# Patient Record
Sex: MALE | Race: WHITE | Employment: FULL TIME | URBAN - NONMETROPOLITAN AREA
[De-identification: names, ages, dates, MRNs, and addresses within clinical notes are randomized per-mention and may not be internally consistent; named-entity substitution may affect disease eponyms.]

---

## 2017-04-19 ENCOUNTER — TELEPHONE (OUTPATIENT)
Dept: INTERNAL MEDICINE | Age: 62
End: 2017-04-19

## 2023-05-09 ENCOUNTER — HOSPITAL ENCOUNTER (OUTPATIENT)
Dept: INTERVENTIONAL RADIOLOGY/VASCULAR | Age: 68
Discharge: HOME | End: 2023-05-09
Payer: MEDICARE

## 2023-05-09 VITALS
TEMPERATURE: 97.3 F | SYSTOLIC BLOOD PRESSURE: 126 MMHG | OXYGEN SATURATION: 96 % | HEART RATE: 70 BPM | DIASTOLIC BLOOD PRESSURE: 72 MMHG

## 2023-05-09 PROCEDURE — 62323 NJX INTERLAMINAR LMBR/SAC: CPT

## 2023-05-09 PROCEDURE — 6360000004 HC RX CONTRAST MEDICATION: Performed by: RADIOLOGY

## 2023-05-09 PROCEDURE — 6360000002 HC RX W HCPCS: Performed by: RADIOLOGY

## 2023-05-09 PROCEDURE — 2500000003 HC RX 250 WO HCPCS: Performed by: RADIOLOGY

## 2023-05-09 PROCEDURE — 2709999900 IR FLUORO GUIDED LUMBAR PUNCTURE THERAPY

## 2023-05-09 RX ORDER — SPIRONOLACTONE 25 MG/1
25 TABLET ORAL DAILY
COMMUNITY

## 2023-05-09 RX ORDER — DEXAMETHASONE SODIUM PHOSPHATE 4 MG/ML
4 INJECTION, SOLUTION INTRA-ARTICULAR; INTRALESIONAL; INTRAMUSCULAR; INTRAVENOUS; SOFT TISSUE ONCE
Status: COMPLETED | OUTPATIENT
Start: 2023-05-09 | End: 2023-05-09

## 2023-05-09 RX ORDER — BUPIVACAINE HYDROCHLORIDE 2.5 MG/ML
5 INJECTION, SOLUTION EPIDURAL; INFILTRATION; INTRACAUDAL ONCE
Status: COMPLETED | OUTPATIENT
Start: 2023-05-09 | End: 2023-05-09

## 2023-05-09 RX ADMIN — IOHEXOL 1 ML: 180 INJECTION INTRAVENOUS at 14:43

## 2023-05-09 RX ADMIN — DEXAMETHASONE SODIUM PHOSPHATE 4 MG: 4 INJECTION, SOLUTION INTRAMUSCULAR; INTRAVENOUS at 14:42

## 2023-05-09 RX ADMIN — BUPIVACAINE HYDROCHLORIDE 2 ML: 2.5 INJECTION, SOLUTION EPIDURAL; INFILTRATION; INTRACAUDAL; PERINEURAL at 14:42

## 2023-05-09 ASSESSMENT — PAIN DESCRIPTION - ORIENTATION: ORIENTATION: LEFT;RIGHT

## 2023-05-09 ASSESSMENT — PAIN SCALES - GENERAL: PAINLEVEL_OUTOF10: 2

## 2023-05-09 ASSESSMENT — PAIN DESCRIPTION - LOCATION: LOCATION: BACK;HIP

## 2023-05-09 NOTE — H&P
Formulation and discussion of sedation / procedure plans, risks, benefits, side effects and alternatives with patient and/or responsible adult completed. History and Physical reviewed and unchanged.     Electronically signed by Jose Wetzel MD on 5/9/23 at 1:58 PM EDT

## 2023-05-09 NOTE — DISCHARGE INSTRUCTIONS
Epidural injection  BLOCK DISCHARGE INSTRUCTION    1. Take it easy and rest the remainder of the day. 2.  Do not drive for the remainder of the day. 3.  You may use ice on the area of the injection to help alleviate discomfort. Avoid heat for the remainder of the day. 4.  Do not soak in water or use a tub bath or hot tub for the remainder of the day. 5.  You may resume normal eating and drinking. 6. This evening you may resume your pain medications and any other medications you had stopped prior to the injection. 7.  Resume activities starting tomorrow in gradual manner. You may resume physical therapy. 8. Follow up with ordering doctor if you continue to have pain. 9.  If you do have another nerve block ordered, follow instructions below:  Bring someone to drive you home. Nothing to eat or drink 2 hours prior. No blood thinners or aspirin products 5 days prior. 8.  Notify Radiology ([phone]), or go to the emergency room immediately if you develop any of the following symptoms: fever, chills, changes in mental status, severe pain, difficulty breathing, prolonged, severe headache, numbness or weakness in your arms or legs, loss of control of your bladder or bowel, excessive redness, swelling, or drainage from the area of injection.

## 2023-05-09 NOTE — PROGRESS NOTES
1350 Patient ambulatory to OPN for Lumbar epidural injection. Patient denies any blood thinners. Confirms NPO last 2 hours and  for discharge. Patient states he has lower back pain travels to buttocks and hips both side. Denies any numbness or tingling. PT RIGHTS AND RESPONSIBILITIES OFFERED TO PT.     1420 Patient to IR for procedure. 1500 Patient back to room post procedure. Band aid to lower back dry and intact. Denies any new pain or numbness. Pedal push and pull equal and strong. 1515 Patient resting in bed. Denies any complaints. Band aid to lower back dry and intact. Patient able to ambulate in room. AVS reviewed with patient. Verbalizes understanding. Patient left ambulatory to discharge lobby.        _M___ Safety:       (Environmental)  [place] to environment  Ensure ID band is correct and in place/ allergy band as needed  Assess for fall risk  Initiate fall precautions as applicable (fall band, side rails, etc.)  Call light within reach  Bed in low position/ wheels locked    _M___ Pain:       Assess pain level and characteristics  Administer analgesics as ordered  Assess effectiveness of pain management and report to MD as needed    _M___ Knowledge Deficit:  Assess baseline knowledge  Provide teaching at level of understanding  Provide teaching via preferred learning method  Evaluate teaching effectiveness    _M___ Hemodynamic/Respiratory Status:       (Pre and Post Procedure Monitoring)  Assess/Monitor vital signs and LOC  Assess Baseline SpO2 prior to any sedation  Obtain weight/height  Assess vital signs/ LOC until patient meets discharge criteria  Monitor procedure site and notify MD of any issues    _M___ Infection-Risk of Central Venous Catheter:  Monitor for infection signs and symptoms (catheter site redness, temperature elevation, etc)  Assess for infection risks  Educate regarding infection prevention  Manage central venous catheter (flushes/ dressing changes per protocol)

## 2023-05-09 NOTE — PROGRESS NOTES
1430 Patient received in IR for lumbar epidural injection. 1435 This procedure has been fully reviewed with the patient and written informed consent has been obtained. 1441 Procedure started with Dr. Jorje Ramos. 1446 Procedure completed; patient tolerated well. Band aid to lower back; no bleeding noted. 1448 Patient on cart; comfort ensured. 1450 Patient taken to OPN via cart.

## 2023-05-09 NOTE — OP NOTE
Department of Radiology  Post Procedure Progress Note    Pre-Procedure Diagnosis:  Lumbar radiculopathy     Procedure Performed:  Epidural block/steroid injection      Anesthesia: local     Findings: successful    Immediate Complications:  None    Estimated Blood Loss: minimal    SEE DICTATED PROCEDURE NOTE FOR COMPLETE DETAILS.       Keily Hernandez MD   5/9/2023 2:48 PM

## 2024-12-02 LAB
BUN BLDV-MCNC: 26 MG/DL
CALCIUM SERPL-MCNC: 8.5 MG/DL
CHLORIDE BLD-SCNC: 101 MMOL/L
CO2: 27 MMOL/L
CREAT SERPL-MCNC: 1.4 MG/DL
EGFR: 55
GLUCOSE BLD-MCNC: 171 MG/DL
POTASSIUM SERPL-SCNC: 4 MMOL/L
SODIUM BLD-SCNC: 142 MMOL/L

## 2025-01-07 LAB
ALBUMIN: 3.4 G/DL
ALP BLD-CCNC: 80 U/L
ALT SERPL-CCNC: 20 U/L
ANION GAP SERPL CALCULATED.3IONS-SCNC: ABNORMAL MMOL/L
AST SERPL-CCNC: 25 U/L
BASOPHILS ABSOLUTE: ABNORMAL
BASOPHILS RELATIVE PERCENT: ABNORMAL
BILIRUB SERPL-MCNC: 0.4 MG/DL (ref 0.1–1.4)
BUN BLDV-MCNC: 33 MG/DL
CALCIUM SERPL-MCNC: 8.5 MG/DL
CHLORIDE BLD-SCNC: 106 MMOL/L
CO2: 22 MMOL/L
CREAT SERPL-MCNC: 2 MG/DL
EOSINOPHILS ABSOLUTE: ABNORMAL
EOSINOPHILS RELATIVE PERCENT: ABNORMAL
GFR, ESTIMATED: 36
GLUCOSE BLD-MCNC: 156 MG/DL
HCT VFR BLD CALC: 26.5 % (ref 41–53)
HEMOGLOBIN: 8.5 G/DL (ref 13.5–17.5)
LYMPHOCYTES ABSOLUTE: ABNORMAL
LYMPHOCYTES RELATIVE PERCENT: ABNORMAL
MCH RBC QN AUTO: ABNORMAL PG
MCHC RBC AUTO-ENTMCNC: ABNORMAL G/DL
MCV RBC AUTO: ABNORMAL FL
MONOCYTES ABSOLUTE: ABNORMAL
MONOCYTES RELATIVE PERCENT: ABNORMAL
NEUTROPHILS ABSOLUTE: ABNORMAL
NEUTROPHILS RELATIVE PERCENT: ABNORMAL
PLATELET # BLD: 311 K/ΜL
PMV BLD AUTO: ABNORMAL FL
POTASSIUM SERPL-SCNC: 4.6 MMOL/L
RBC # BLD: 2.65 10^6/ΜL
SODIUM BLD-SCNC: 142 MMOL/L
TOTAL PROTEIN: 6.3 G/DL (ref 6.4–8.2)
WBC # BLD: 13.7 10^3/ML

## 2025-01-08 PROBLEM — J98.11 ATELECTASIS: Status: ACTIVE | Noted: 2024-12-27

## 2025-01-08 PROBLEM — R73.9 STRESS HYPERGLYCEMIA: Status: ACTIVE | Noted: 2024-12-26

## 2025-01-08 PROBLEM — Z95.1 S/P CABG X 4: Status: ACTIVE | Noted: 2024-12-26

## 2025-01-08 PROBLEM — E87.70 HYPERVOLEMIA: Status: ACTIVE | Noted: 2024-12-28

## 2025-01-08 PROBLEM — G47.33 OBSTRUCTIVE SLEEP APNEA ON CPAP: Status: ACTIVE | Noted: 2024-12-26

## 2025-01-08 PROBLEM — J95.811 PNEUMOTHORAX, POSTOPERATIVE: Status: ACTIVE | Noted: 2024-12-27

## 2025-01-08 PROBLEM — I10 PRIMARY HYPERTENSION: Status: ACTIVE | Noted: 2024-12-26

## 2025-01-08 PROBLEM — K56.7 POSTOPERATIVE ILEUS (HCC): Status: ACTIVE | Noted: 2024-12-28

## 2025-01-08 PROBLEM — I25.10 3-VESSEL CAD: Status: ACTIVE | Noted: 2024-12-26

## 2025-01-08 PROBLEM — F17.200 SMOKER: Status: ACTIVE | Noted: 2024-12-26

## 2025-01-08 PROBLEM — E78.5 HLD (HYPERLIPIDEMIA): Status: ACTIVE | Noted: 2024-12-26

## 2025-01-08 PROBLEM — K91.89 POSTOPERATIVE ILEUS (HCC): Status: ACTIVE | Noted: 2024-12-28

## 2025-01-08 PROBLEM — I95.9 HYPOTENSION, UNSPECIFIED: Status: ACTIVE | Noted: 2024-12-27

## 2025-01-08 PROBLEM — T81.9XXA COMPLICATION OF SURGICAL PROCEDURE: Status: ACTIVE | Noted: 2024-12-27

## 2025-01-08 PROBLEM — G89.18 POSTOPERATIVE PAIN: Status: ACTIVE | Noted: 2024-12-26

## 2025-01-08 PROBLEM — I25.10 CORONARY ARTERY DISEASE INVOLVING NATIVE CORONARY ARTERY OF NATIVE HEART: Status: ACTIVE | Noted: 2024-12-26

## 2025-01-08 PROBLEM — Z71.6 ENCOUNTER FOR SMOKING CESSATION COUNSELING: Status: ACTIVE | Noted: 2024-12-17

## 2025-01-08 PROBLEM — M17.9 OSTEOARTHRITIS OF KNEE: Status: ACTIVE | Noted: 2025-01-08

## 2025-01-08 PROBLEM — E11.9 CONTROLLED TYPE 2 DIABETES MELLITUS WITHOUT COMPLICATION, WITHOUT LONG-TERM CURRENT USE OF INSULIN (HCC): Status: ACTIVE | Noted: 2025-01-02

## 2025-01-08 PROBLEM — K59.00 CONSTIPATION: Status: ACTIVE | Noted: 2024-12-28

## 2025-01-08 RX ORDER — AMIODARONE HYDROCHLORIDE 200 MG/1
200 TABLET ORAL DAILY
COMMUNITY
Start: 2025-01-02 | End: 2025-02-05

## 2025-01-08 RX ORDER — POLYETHYLENE GLYCOL 3350 17 G/17G
17 POWDER, FOR SOLUTION ORAL DAILY
COMMUNITY
Start: 2025-01-02

## 2025-01-08 RX ORDER — ASPIRIN 81 MG/1
81 TABLET ORAL DAILY
COMMUNITY

## 2025-01-08 RX ORDER — PANTOPRAZOLE SODIUM 20 MG/1
20 TABLET, DELAYED RELEASE ORAL DAILY
COMMUNITY
Start: 2025-01-02 | End: 2025-02-01

## 2025-01-08 RX ORDER — POTASSIUM CHLORIDE 750 MG/1
10 TABLET, EXTENDED RELEASE ORAL DAILY
COMMUNITY
Start: 2025-01-02

## 2025-01-08 RX ORDER — AMOXICILLIN 250 MG
1 CAPSULE ORAL DAILY
COMMUNITY
Start: 2025-01-02

## 2025-01-08 RX ORDER — MAGNESIUM OXIDE 400 MG/1
400 TABLET ORAL DAILY
COMMUNITY
Start: 2025-01-02

## 2025-01-08 RX ORDER — METFORMIN HYDROCHLORIDE 500 MG/1
500 TABLET, EXTENDED RELEASE ORAL
COMMUNITY
Start: 2024-09-20

## 2025-01-08 RX ORDER — FUROSEMIDE 40 MG/1
40 TABLET ORAL DAILY
COMMUNITY
Start: 2025-01-02

## 2025-01-08 RX ORDER — METOPROLOL SUCCINATE 25 MG/1
25 TABLET, EXTENDED RELEASE ORAL DAILY
COMMUNITY
Start: 2025-01-03

## 2025-01-08 RX ORDER — ATORVASTATIN CALCIUM 40 MG/1
40 TABLET, FILM COATED ORAL NIGHTLY
COMMUNITY
Start: 2025-01-01

## 2025-02-06 ENCOUNTER — OFFICE VISIT (OUTPATIENT)
Dept: NEPHROLOGY | Age: 70
End: 2025-02-06
Payer: COMMERCIAL

## 2025-02-06 VITALS
DIASTOLIC BLOOD PRESSURE: 70 MMHG | WEIGHT: 202 LBS | OXYGEN SATURATION: 98 % | BODY MASS INDEX: 28.98 KG/M2 | HEART RATE: 64 BPM | SYSTOLIC BLOOD PRESSURE: 126 MMHG

## 2025-02-06 DIAGNOSIS — N18.31 STAGE 3A CHRONIC KIDNEY DISEASE (HCC): Primary | ICD-10-CM

## 2025-02-06 PROCEDURE — 1123F ACP DISCUSS/DSCN MKR DOCD: CPT | Performed by: INTERNAL MEDICINE

## 2025-02-06 PROCEDURE — G2211 COMPLEX E/M VISIT ADD ON: HCPCS | Performed by: INTERNAL MEDICINE

## 2025-02-06 PROCEDURE — 3074F SYST BP LT 130 MM HG: CPT | Performed by: INTERNAL MEDICINE

## 2025-02-06 PROCEDURE — 99214 OFFICE O/P EST MOD 30 MIN: CPT | Performed by: INTERNAL MEDICINE

## 2025-02-06 PROCEDURE — 3078F DIAST BP <80 MM HG: CPT | Performed by: INTERNAL MEDICINE

## 2025-02-06 NOTE — PROGRESS NOTES
[facility] [place] [facility]  [facility]  [address]  [address]  [facility] [address]  Dept: [phone]  Loc: [phone]  Outpatient Consult  [phone]  2/6/2025 11:08 AM EST        Pt Name:    Nirmal Trinidad  YOB: 1955  Primary Care Physician:  Tamia Orozco, APRN - NP     Chief Complaint:   Chief Complaint   Patient presents with    New Patient     Ref Tamia Orozco CKDIII        Background Information/Interval History:   The patient is a 69 y.o. year old  male referred by PCP for CKD III.   He has a hx of HTN, HLD, SOFIYA, CAD. Was recently hospitalized a month ago at [facility] for CAD/CABG x 4 with aortic root replacement.   Creatinine during hospitalization around 1.8 did peak at 2.2.     Prior to that  looks like he ranged 1.4-1.6 mg/dL.   Was on lasix which stopped a few weeks ago.   Most recent creatinine 1.5.  Denies nsaid use.   Mild edema which has improved.   Hx tobacco use.   + slow stream and dribbling.  Is diabetic past 1-2 years, on metformin.          Allergies:  Patient has no known allergies.        Past Medical History:  Past Medical History:   Diagnosis Date    History of nephrolithiasis 4/2014    s/p stent, laser - [place] [facility]    Hypercholesterolemia     borderline - no medication    Hyperglycemia     fasting glucose 135    Incomplete tear of right rotator cuff     Primary hypertension 12/26/2024    RBBB (right bundle branch block)     Sleep apnea     wears CPAP nightly    Tobacco abuse         Past Surgical History:  Past Surgical History:   Procedure Laterality Date    KIDNEY STONE SURGERY  4-2014    URETER STENT PLACEMENT  4-2015    WISDOM TOOTH EXTRACTION  1983        Family History:  Family History   Problem Relation Age of Onset    High Blood Pressure Mother     High Cholesterol Mother     Arthritis Mother     Asthma Father     Emphysema Father         Social History:  Social History     Socioeconomic

## 2025-05-01 LAB
BASOPHILS ABSOLUTE: ABNORMAL
BASOPHILS RELATIVE PERCENT: ABNORMAL
BILIRUBIN, URINE: NEGATIVE
BLOOD, URINE: NEGATIVE
CLARITY, UA: CLEAR
COLOR, UA: YELLOW
CREATININE URINE: 196 MG/DL
EOSINOPHILS ABSOLUTE: ABNORMAL
EOSINOPHILS RELATIVE PERCENT: ABNORMAL
ESTIMATED AVERAGE GLUCOSE: NORMAL
GLUCOSE URINE: NEGATIVE
HBA1C MFR BLD: 6.2 %
HCT VFR BLD CALC: 36.8 % (ref 41–53)
HEMOGLOBIN: 12.2 G/DL (ref 13.5–17.5)
KETONES, URINE: NEGATIVE
LEUKOCYTE ESTERASE, URINE: NEGATIVE
LYMPHOCYTES ABSOLUTE: ABNORMAL
LYMPHOCYTES RELATIVE PERCENT: ABNORMAL
MCH RBC QN AUTO: ABNORMAL PG
MCHC RBC AUTO-ENTMCNC: ABNORMAL G/DL
MCV RBC AUTO: ABNORMAL FL
MICROALBUMIN/CREAT 24H UR: 5520 MG/DL
MICROALBUMIN/CREAT UR-RTO: 28 MG/G
MONOCYTES ABSOLUTE: ABNORMAL
MONOCYTES RELATIVE PERCENT: ABNORMAL
NEUTROPHILS ABSOLUTE: ABNORMAL
NEUTROPHILS RELATIVE PERCENT: ABNORMAL
NITRITE, URINE: NEGATIVE
PH UA: 5.5 (ref 4.5–8)
PHOSPHORUS: 3.9 MG/DL
PLATELET # BLD: 186 K/ΜL
PMV BLD AUTO: ABNORMAL FL
PROTEIN UA: NORMAL
PTH INTACT: 31
RBC # BLD: 3.86 10^6/ΜL
SPECIFIC GRAVITY UA: 1.03 (ref 1–1.03)
UROBILINOGEN, URINE: NORMAL
WBC # BLD: 5.5 10^3/ML

## 2025-05-09 ENCOUNTER — OFFICE VISIT (OUTPATIENT)
Dept: NEPHROLOGY | Age: 70
End: 2025-05-09
Payer: MEDICARE

## 2025-05-09 VITALS
OXYGEN SATURATION: 98 % | BODY MASS INDEX: 28.98 KG/M2 | DIASTOLIC BLOOD PRESSURE: 60 MMHG | SYSTOLIC BLOOD PRESSURE: 114 MMHG | WEIGHT: 202 LBS | HEART RATE: 90 BPM

## 2025-05-09 DIAGNOSIS — N18.31 STAGE 3A CHRONIC KIDNEY DISEASE (HCC): Primary | ICD-10-CM

## 2025-05-09 PROCEDURE — 1123F ACP DISCUSS/DSCN MKR DOCD: CPT | Performed by: INTERNAL MEDICINE

## 2025-05-09 PROCEDURE — G8427 DOCREV CUR MEDS BY ELIG CLIN: HCPCS | Performed by: INTERNAL MEDICINE

## 2025-05-09 PROCEDURE — 3017F COLORECTAL CA SCREEN DOC REV: CPT | Performed by: INTERNAL MEDICINE

## 2025-05-09 PROCEDURE — 4004F PT TOBACCO SCREEN RCVD TLK: CPT | Performed by: INTERNAL MEDICINE

## 2025-05-09 PROCEDURE — 1159F MED LIST DOCD IN RCRD: CPT | Performed by: INTERNAL MEDICINE

## 2025-05-09 PROCEDURE — G8419 CALC BMI OUT NRM PARAM NOF/U: HCPCS | Performed by: INTERNAL MEDICINE

## 2025-05-09 PROCEDURE — 99214 OFFICE O/P EST MOD 30 MIN: CPT | Performed by: INTERNAL MEDICINE

## 2025-05-09 PROCEDURE — 3074F SYST BP LT 130 MM HG: CPT | Performed by: INTERNAL MEDICINE

## 2025-05-09 PROCEDURE — 1160F RVW MEDS BY RX/DR IN RCRD: CPT | Performed by: INTERNAL MEDICINE

## 2025-05-09 PROCEDURE — 3078F DIAST BP <80 MM HG: CPT | Performed by: INTERNAL MEDICINE

## 2025-05-09 NOTE — PROGRESS NOTES
[facility] [place] [facility]  [facility]  [address]  [address]  [place] [place] [address]  Dept: [phone]  Loc: [phone]  Progress Note  5/9/2025 9:07 AM      Pt Name:    Nirmal Trinidad  YOB: 1955  Primary Care Physician:  Tamia Orozco, ROSANA - NP     Chief Complaint:   Chief Complaint   Patient presents with    Follow-up     CKD III        History of Present Illness:   This is a follow-up visit for CKD III.   He has a hx of DM, HTN, HLD, SOFIYA, CAD/CABG x 4 with aortic root replacement. Was recently hospitalized a month ago at [facility] for CAD/CABG x 4 with aortic root replacement.       Feels ok. No complaints.   Sodium was 149.  States he had some hypotension SBP 90s after cardiac rehab last week but was asymptomatic. Hasn't been drinking as much water.      Pertinent items are noted in HPI.         Past History:  Past Medical History:   Diagnosis Date    History of nephrolithiasis 4/2014    s/p stent, laser - [place]    Hypercholesterolemia     borderline - no medication    Hyperglycemia     fasting glucose 135    Incomplete tear of right rotator cuff     Primary hypertension 12/26/2024    RBBB (right bundle branch block)     Sleep apnea     wears CPAP nightly    Tobacco abuse      Past Surgical History:   Procedure Laterality Date    KIDNEY STONE SURGERY  4-2014    URETER STENT PLACEMENT  4-2015    WISDOM TOOTH EXTRACTION  1983        VITALS:  /60 (BP Site: Left Upper Arm, Patient Position: Sitting, BP Cuff Size: Large Adult)   Pulse 90   Wt 91.6 kg (202 lb)   SpO2 98%   BMI 28.98 kg/m²   Wt Readings from Last 3 Encounters:   05/09/25 91.6 kg (202 lb)   02/06/25 91.6 kg (202 lb)   11/16/15 105.2 kg (232 lb)     Body mass index is 28.98 kg/m².     General Appearance: alert and cooperative with exam, appears comfortable, no distress  HEENT: EOMI, moist oral mucus membranes  Neck: No jugular venous